# Patient Record
Sex: FEMALE
[De-identification: names, ages, dates, MRNs, and addresses within clinical notes are randomized per-mention and may not be internally consistent; named-entity substitution may affect disease eponyms.]

---

## 2023-06-06 PROBLEM — Z00.00 ENCOUNTER FOR PREVENTIVE HEALTH EXAMINATION: Status: ACTIVE | Noted: 2023-06-06

## 2023-06-07 ENCOUNTER — APPOINTMENT (OUTPATIENT)
Dept: CARDIOLOGY | Facility: CLINIC | Age: 48
End: 2023-06-07
Payer: COMMERCIAL

## 2023-06-07 VITALS
HEIGHT: 66 IN | BODY MASS INDEX: 34.07 KG/M2 | SYSTOLIC BLOOD PRESSURE: 134 MMHG | OXYGEN SATURATION: 95 % | HEART RATE: 82 BPM | DIASTOLIC BLOOD PRESSURE: 74 MMHG | WEIGHT: 212 LBS

## 2023-06-07 PROCEDURE — 99204 OFFICE O/P NEW MOD 45 MIN: CPT

## 2023-06-07 PROCEDURE — 93000 ELECTROCARDIOGRAM COMPLETE: CPT

## 2023-06-07 NOTE — REVIEW OF SYSTEMS
[Weight Gain (___ Lbs)] : [unfilled] ~Ulb weight gain [Feeling Fatigued] : not feeling fatigued [SOB] : shortness of breath [Dyspnea on exertion] : dyspnea during exertion [Chest Discomfort] : no chest discomfort [Lower Ext Edema] : lower extremity edema [Leg Claudication] : no intermittent leg claudication [Palpitations] : no palpitations [Orthopnea] : no orthopnea [PND] : no PND [Syncope] : no syncope [Cough] : no cough [Abdominal Pain] : no abdominal pain [Joint Pain] : joint pain [Rash] : no rash [Dizziness] : no dizziness

## 2023-06-07 NOTE — ASSESSMENT
[FreeTextEntry1] : Assessment:\par #Leg swelling\par - Non-pitting edema of bilateral calves, no swelling of feet. Lungs clear. \par #Dyspnea on exertion\par - Exercise, stairs, walking outside make her short of breath\par #Former tobacco user\par #FMH of CHF\par #Prevention counseling\par - A1c 5.6%\par - BMI 34\par \par Plan:\par - Stress echo\par - Labs\par - Conservative management for leg swelling at this time with leg elevation, compression stockings (15-20 mmHg)\par - Recommend patient be evaluated by endocrinologist and gynecologist (perimenopausal)\par - Return to clinic in after testing

## 2023-06-07 NOTE — PHYSICAL EXAM
[Well Developed] : well developed [Well Nourished] : well nourished [No Acute Distress] : no acute distress [Normal Conjunctiva] : normal conjunctiva [Normal S1, S2] : normal S1, S2 [No Murmur] : no murmur [No Rub] : no rub [No Gallop] : no gallop [Clear Lung Fields] : clear lung fields [Good Air Entry] : good air entry [No Respiratory Distress] : no respiratory distress  [Soft] : abdomen soft [Non Tender] : non-tender [No Masses/organomegaly] : no masses/organomegaly [Normal Bowel Sounds] : normal bowel sounds [Moves all extremities] : moves all extremities [No Focal Deficits] : no focal deficits [Normal Speech] : normal speech [No varicosities] : no varicosities [No chronic venous stasis changes] : no chronic venous stasis changes [No rashes] : no rashes [No ulcers] : no ulcers [No cyanosis] : no cyanosis [Calf and Thigh] : negative on the calf and thigh [Absent] : Left Lower Extremity: absent [de-identified] : non-pitting edema of bilateral calves

## 2023-06-07 NOTE — HISTORY OF PRESENT ILLNESS
[FreeTextEntry1] : 48F  (no complications) with asthma, vitiligo here for evaluation of SOB and leg swelling. \par \par Referring: self\par PCP: Wei Osorio\par \par 23\par Eats healthy and exercises, but still gaining weight. Calves have increased in size. Worse as day progresses. Feels that swelling is worse after exercise. Legs are sore, unable to tolerate massage.  \par Over the past year has had dyspnea on exertion with stairs. COVID and flu 2022 may have contributed. \par + Knee and hip pain \par \par Exercises 4-5 times per week\par \par Quit smoking 9 years ago\par \par Labs 4/10/23\par , HDL 54, , \par Cr 1.04, K 4.5\par A1c 5.6%\par TSH 2.5\par Hgb 14.2\par \par FMH\par Father: MV replacement, CHF\par Mother: no heart conditions\par 1/2 brother: asthma, gout\par Sister: no cardiac conditions\par \par \par \par

## 2023-07-03 ENCOUNTER — APPOINTMENT (OUTPATIENT)
Dept: CARDIOLOGY | Facility: CLINIC | Age: 48
End: 2023-07-03
Payer: COMMERCIAL

## 2023-07-03 PROCEDURE — 93351 STRESS TTE COMPLETE: CPT

## 2023-07-03 PROCEDURE — 93320 DOPPLER ECHO COMPLETE: CPT

## 2023-07-03 PROCEDURE — 93325 DOPPLER ECHO COLOR FLOW MAPG: CPT

## 2023-07-10 ENCOUNTER — APPOINTMENT (OUTPATIENT)
Dept: CARDIOLOGY | Facility: CLINIC | Age: 48
End: 2023-07-10
Payer: COMMERCIAL

## 2023-07-10 VITALS
HEART RATE: 77 BPM | DIASTOLIC BLOOD PRESSURE: 70 MMHG | WEIGHT: 214 LBS | SYSTOLIC BLOOD PRESSURE: 110 MMHG | HEIGHT: 66 IN | BODY MASS INDEX: 34.39 KG/M2

## 2023-07-10 DIAGNOSIS — Z71.3 DIETARY COUNSELING AND SURVEILLANCE: ICD-10-CM

## 2023-07-10 DIAGNOSIS — Z71.89 OTHER SPECIFIED COUNSELING: ICD-10-CM

## 2023-07-10 DIAGNOSIS — Z71.82 EXERCISE COUNSELING: ICD-10-CM

## 2023-07-10 DIAGNOSIS — R06.09 OTHER FORMS OF DYSPNEA: ICD-10-CM

## 2023-07-10 DIAGNOSIS — Z13.6 ENCOUNTER FOR SCREENING FOR CARDIOVASCULAR DISORDERS: ICD-10-CM

## 2023-07-10 DIAGNOSIS — R60.0 LOCALIZED EDEMA: ICD-10-CM

## 2023-07-10 PROCEDURE — 99214 OFFICE O/P EST MOD 30 MIN: CPT

## 2023-07-10 RX ORDER — NORETHINDRONE ACETATE 5 MG/1
5 TABLET ORAL
Qty: 14 | Refills: 0 | Status: ACTIVE | COMMUNITY
Start: 2023-06-29

## 2023-07-10 NOTE — PHYSICAL EXAM
[Well Developed] : well developed [Well Nourished] : well nourished [No Acute Distress] : no acute distress [Normal Conjunctiva] : normal conjunctiva [Normal S1, S2] : normal S1, S2 [No Murmur] : no murmur [No Rub] : no rub [No Gallop] : no gallop [Clear Lung Fields] : clear lung fields [Good Air Entry] : good air entry [No Respiratory Distress] : no respiratory distress  [Soft] : abdomen soft [Non Tender] : non-tender [No Masses/organomegaly] : no masses/organomegaly [Normal Bowel Sounds] : normal bowel sounds [Moves all extremities] : moves all extremities [No Focal Deficits] : no focal deficits [Normal Speech] : normal speech [No ulcers] : no ulcers [No varicosities] : no varicosities [No chronic venous stasis changes] : no chronic venous stasis changes [No cyanosis] : no cyanosis [No rashes] : no rashes [Absent] : Left Lower Extremity: absent [No edema] : no edema [None] : Left: none [Calf and Thigh] : negative on the calf and thigh

## 2023-07-10 NOTE — REVIEW OF SYSTEMS
[Weight Gain (___ Lbs)] : [unfilled] ~Ulb weight gain [Dyspnea on exertion] : dyspnea during exertion [Lower Ext Edema] : lower extremity edema [Feeling Fatigued] : not feeling fatigued [SOB] : no shortness of breath [Chest Discomfort] : no chest discomfort [Leg Claudication] : no intermittent leg claudication [Palpitations] : no palpitations [Orthopnea] : no orthopnea [PND] : no PND [Syncope] : no syncope [Cough] : no cough [Abdominal Pain] : no abdominal pain [Joint Pain] : no joint pain [Rash] : no rash [Dizziness] : no dizziness

## 2023-07-10 NOTE — HISTORY OF PRESENT ILLNESS
[FreeTextEntry1] : 48F  (no complications) with asthma, vitiligo here for follow-up evaluation of SOB and leg swelling. \par \par Referring: self\par PCP: Wei Osorio\par \par 7/10/23\par Feeling well. Cross fit. Leg swelling on hot days, elevates. Went to 2 medical supply stores for compression stockings but they did not have a contract with her insurance company. \par \par 23\par Eats healthy and exercises, but still gaining weight. Calves have increased in size. Worse as day progresses. Feels that swelling is worse after exercise. Legs are sore, unable to tolerate massage.  \par Over the past year has had dyspnea on exertion with stairs. COVID and flu 2022 may have contributed. \par + Knee and hip pain \par \par Exercises 4-5 times per week\par \par Quit smoking 9 years ago\par \par Labs 4/10/23\par , HDL 54, , \par Cr 1.04, K 4.5\par A1c 5.6%\par TSH 2.5\par Hgb 14.2\par \par FMH\par Father: MV replacement, CHF\par Mother: no heart conditions\par 1/2 brother: asthma, gout\par Sister: no cardiac conditions\par \par \par \par

## 2023-07-10 NOTE — ASSESSMENT
[FreeTextEntry1] : Assessment:\par #Leg swelling\par - None today\par #Dyspnea on exertion\par - Exercise, stairs, walking outside make her short of breath\par - Stress echo 7/3/23 negative stress echo, 9 minutes 35 sec, 11.2 METs, normal LV sys function, LVEF 62%, normal RV size/function, normal atria\par #Former tobacco user\par #FMH of CHF\par #Prevention counseling\par - A1c 5.6%\par - BMI 34\par \par 6/29/23\par , HDL 59, , \par Cr 0.95, K 4.8, AST 16, ALT 16\par TSH 1.18\par Hgb 13.7\par apolipoprotein A 182\par BNP 20\par \par Plan:\par - Reviewed results\par - Counseled patient on diet and exercise \par - Conservative management for leg swelling at this time with leg elevation, compression stockings (15-20 mmHg)\par - She has follow-up with endocrinologist and gynecologist (perimenopausal)\par - Return to clinic in 1 year or sooner PRN

## 2024-03-10 PROBLEM — Z71.82 EXERCISE COUNSELING: Status: ACTIVE | Noted: 2023-07-10

## 2024-07-08 ENCOUNTER — APPOINTMENT (OUTPATIENT)
Dept: CARDIOLOGY | Facility: CLINIC | Age: 49
End: 2024-07-08

## 2025-03-05 ENCOUNTER — APPOINTMENT (OUTPATIENT)
Dept: SLEEP CENTER | Facility: HOSPITAL | Age: 50
End: 2025-03-05

## 2025-03-05 ENCOUNTER — OUTPATIENT (OUTPATIENT)
Dept: OUTPATIENT SERVICES | Facility: HOSPITAL | Age: 50
LOS: 1 days | Discharge: HOME | End: 2025-03-05
Payer: COMMERCIAL

## 2025-03-05 DIAGNOSIS — G47.33 OBSTRUCTIVE SLEEP APNEA (ADULT) (PEDIATRIC): ICD-10-CM

## 2025-03-05 PROCEDURE — 95800 SLP STDY UNATTENDED: CPT | Mod: 26

## 2025-03-05 PROCEDURE — 95800 SLP STDY UNATTENDED: CPT

## 2025-03-11 DIAGNOSIS — G47.33 OBSTRUCTIVE SLEEP APNEA (ADULT) (PEDIATRIC): ICD-10-CM
